# Patient Record
Sex: MALE | Race: ASIAN | NOT HISPANIC OR LATINO | URBAN - METROPOLITAN AREA
[De-identification: names, ages, dates, MRNs, and addresses within clinical notes are randomized per-mention and may not be internally consistent; named-entity substitution may affect disease eponyms.]

---

## 2023-11-23 ENCOUNTER — EMERGENCY (EMERGENCY)
Facility: HOSPITAL | Age: 23
LOS: 1 days | Discharge: HOME | End: 2023-11-23
Attending: STUDENT IN AN ORGANIZED HEALTH CARE EDUCATION/TRAINING PROGRAM | Admitting: STUDENT IN AN ORGANIZED HEALTH CARE EDUCATION/TRAINING PROGRAM
Payer: COMMERCIAL

## 2023-11-23 VITALS
HEART RATE: 88 BPM | DIASTOLIC BLOOD PRESSURE: 79 MMHG | TEMPERATURE: 98 F | WEIGHT: 184.09 LBS | RESPIRATION RATE: 18 BRPM | SYSTOLIC BLOOD PRESSURE: 144 MMHG | OXYGEN SATURATION: 98 % | HEIGHT: 71 IN

## 2023-11-23 PROCEDURE — 99283 EMERGENCY DEPT VISIT LOW MDM: CPT | Mod: 25

## 2023-11-23 PROCEDURE — 12001 RPR S/N/AX/GEN/TRNK 2.5CM/<: CPT

## 2023-11-23 PROCEDURE — 99282 EMERGENCY DEPT VISIT SF MDM: CPT

## 2023-11-23 NOTE — ED PROVIDER NOTE - MDM ORDERS SUBMITTED SELECTION
-- DO NOT REPLY / DO NOT REPLY ALL --  -- Message is from [facility] ([facility]) --    General Patient Message: patient calling to reschedule appt, soonest available is Jan 09,2024.   Patient is requesting a refill on meds to get to appt. If refill not available please find a sooner appt       Alternative phone number: none    Can a detailed message be left? Yes    Message Turnaround: [place]:    Refer to site's KB page for routing instructions    Please give this turnaround time to the caller:   \"You can expect to receive a response 1-3 business days after your provider's clinical team reviews the message\"               Medications

## 2023-11-23 NOTE — ED PROVIDER NOTE - ATTENDING APP SHARED VISIT CONTRIBUTION OF CARE
Vince Gudino MD (Attending Physician):    I performed a history and physical exam of the patient and discussed their management with the TONY. I have reviewed the TONY note and agree with the documented findings and plan of care, except as noted in the MDM. This was a shared visit with an TONY. I reviewed and verified the documentation and independently performed my own history/exam/medical decision making. My medical decision making and observations are found in the MDM. Please refer to any progress notes for updates on clinical course.

## 2023-11-23 NOTE — ED PROVIDER NOTE - SKIN, MLM
subcentimeter superficial laceration left 5th digit by nail but not involving nail, scant oozing of blood

## 2023-11-23 NOTE — ED PROVIDER NOTE - OBJECTIVE STATEMENT
23 M presents with laceration to left 5th digit this morning while cutting onion. Denies injuries elsewhere. UTD tetanus. Has been applying pressure to area but states wound still oozing at times.

## 2023-11-23 NOTE — ED PROCEDURE NOTE - CPROC ED DIRECTIONAL
Reason for Call:  Other     Detailed comments: patient is calling to speak with nurse or provider, he has some more questions about his surgery site.   Please call to discuss  Thank you     Phone Number Patient can be reached at: Home number on file [phone] (home)    Best Time: any    Can we leave a detailed message on this number? YES    Call taken on 8/24/2020 at 9:25 AM by Charlene Betancourt    
Spoke with patient.  Stated the incision is still swollen and yesterday had a green discharge.  He would like to come in and see Dr. Vega    Appointment scheduled for 9/25  [facility]    Nicki Silva MA      
left

## 2023-11-23 NOTE — ED ADULT TRIAGE NOTE - CHIEF COMPLAINT QUOTE
Patient to triage with complaints of left pinky laceration.  Patient states finger was cut 11 hours ago but has not stopped bleeding.  Bandage applied prior to arrival.

## 2023-11-23 NOTE — ED ADULT NURSE NOTE - OBJECTIVE STATEMENT
Pt is a 23yr old male, c/o laceration to left pinky finger with kitchen knife, not actively bleeding. Pt is A+Ox3, calm and cooperative, able to move all extremities. Sensation intact, no deformities. Unlabored breathing at this time. No distress noted. MD evaluation in progress.

## 2023-11-23 NOTE — ED ADULT NURSE NOTE - NSFALLUNIVINTERV_ED_ALL_ED
Bed/Stretcher in lowest position, wheels locked, appropriate side rails in place/Call bell, personal items and telephone in reach/Instruct patient to call for assistance before getting out of bed/chair/stretcher/Non-slip footwear applied when patient is off stretcher/[place] to call system/Physically safe environment - no spills, clutter or unnecessary equipment/Purposeful proactive rounding/Room/bathroom lighting operational, light cord in reach
